# Patient Record
Sex: FEMALE | Race: WHITE
[De-identification: names, ages, dates, MRNs, and addresses within clinical notes are randomized per-mention and may not be internally consistent; named-entity substitution may affect disease eponyms.]

---

## 2019-12-11 ENCOUNTER — HOSPITAL ENCOUNTER (OUTPATIENT)
Dept: HOSPITAL 74 - FASU | Age: 69
Discharge: HOME | End: 2019-12-11
Attending: OPHTHALMOLOGY
Payer: COMMERCIAL

## 2019-12-11 VITALS — BODY MASS INDEX: 20.2 KG/M2

## 2019-12-11 VITALS — TEMPERATURE: 98.6 F

## 2019-12-11 VITALS — HEART RATE: 72 BPM | SYSTOLIC BLOOD PRESSURE: 120 MMHG | DIASTOLIC BLOOD PRESSURE: 70 MMHG

## 2019-12-11 DIAGNOSIS — H26.8: Primary | ICD-10-CM

## 2019-12-11 PROCEDURE — 08RK3JZ REPLACEMENT OF LEFT LENS WITH SYNTHETIC SUBSTITUTE, PERCUTANEOUS APPROACH: ICD-10-PCS | Performed by: OPHTHALMOLOGY

## 2019-12-11 RX ADMIN — PHENYLEPHRINE HYDROCHLORIDE ONE DROP: 0.25 SPRAY NASAL at 09:10

## 2019-12-11 RX ADMIN — PHENYLEPHRINE HYDROCHLORIDE ONE DROP: 0.25 SPRAY NASAL at 09:15

## 2019-12-11 RX ADMIN — CIPROFLOXACIN HYDROCHLORIDE ONE DROP: 3 SOLUTION/ DROPS OPHTHALMIC at 09:15

## 2019-12-11 RX ADMIN — CYCLOPENTOLATE HYDROCHLORIDE ONE DROP: 20 SOLUTION/ DROPS OPHTHALMIC at 09:10

## 2019-12-11 RX ADMIN — TROPICAMIDE ONE DROP: 10 SOLUTION/ DROPS OPHTHALMIC at 09:10

## 2019-12-11 RX ADMIN — PHENYLEPHRINE HYDROCHLORIDE ONE DROP: 0.25 SPRAY NASAL at 09:20

## 2019-12-11 RX ADMIN — CIPROFLOXACIN HYDROCHLORIDE ONE DROP: 3 SOLUTION/ DROPS OPHTHALMIC at 09:20

## 2019-12-11 RX ADMIN — TROPICAMIDE ONE DROP: 10 SOLUTION/ DROPS OPHTHALMIC at 09:20

## 2019-12-11 RX ADMIN — CYCLOPENTOLATE HYDROCHLORIDE ONE DROP: 20 SOLUTION/ DROPS OPHTHALMIC at 09:15

## 2019-12-11 RX ADMIN — TROPICAMIDE ONE DROP: 10 SOLUTION/ DROPS OPHTHALMIC at 09:15

## 2019-12-11 RX ADMIN — CIPROFLOXACIN HYDROCHLORIDE ONE DROP: 3 SOLUTION/ DROPS OPHTHALMIC at 09:10

## 2019-12-11 RX ADMIN — CYCLOPENTOLATE HYDROCHLORIDE ONE DROP: 20 SOLUTION/ DROPS OPHTHALMIC at 09:20

## 2019-12-12 NOTE — OP
DATE OF OPERATION:  12/11/2019

 

OPERATIVE PROCEDURE:  Lens Phacoemulsification with Posterior Chamber Intraocular

Lens Placement Left Eye

 

PREOPERATIVE DIAGNOSIS:  Visually Significant Cataract of Left Eye

 

POSTOPERATIVE DIAGNOSIS:  Visually Significant Cataract of Left Eye

 

SURGEON:  Glen Natarajan MD

 

ANESTHESIA:  MAC

 

ANESTHESIOLOGIST: 

 

PROCEDURE:  The patient was brought to the operating room and placed under monitored

anesthesia care by Anesthesia.  A drop of Tetracaine was then placed over the left

eye.  The patient was then prepped and draped in the usual sterile manner.  A

speculum was then placed over the left eye. The eye was then well irrigated with

copious amounts of BSS (balanced salt solution). 

 

The operating microscope was then moved into position.  A paracentesis was performed

using a 15 degree blade.  At this point 0.5 mL of 1% preservative-free lidocaine was

injected into the anterior chamber.  Amvisc plus was then injected into the anterior

chamber.  A clear corneal incision was then formed using a 2.2 mm keratome. A

capsulorrhexis was then performed in a continuous circular fashion beginning with a

cystotome, completed with an Utratas forceps. Hydrodissection was then performed

using BSS on a cannula. The phaco probe was then introduced through the corneal wound

and the cataract was removed using the phaco chop technique.  Approximately 3 seconds

of absolute phaco time was used.  The remaining cortex was then removed using

irrigation and aspiration with an I/A probe. The capsule was then filled with regular

Amvisc and the capsule was noted to be intact.  A previously selected foldable

posterior chamber intraocular lens was then injected into the capsule through the

corneal wound using a lens injector.  It was then dialed into position using a

Sinskey hook.  The Amvisc was then removed using irrigation and aspiration.  Miostat

was then injected through the paracentesis to constrict the pupil.  The paracentesis

and corneal wound were then hydrated and noted to be watertight.  A drop of Maxitrol

was then placed over the eye.  The speculum was removed, and clear shield was taped

over the eye. 

 

The patient tolerated the procedure well and there were no surgical complications.

The patient was asked to follow up in my office the next day.  

 

 

GLEN NATARAJAN M.D.

 

[id number]

DD: 12/11/2019 10:48

DT: 12/12/2019 10:12

Job #:  [id number]